# Patient Record
Sex: MALE | Race: WHITE | NOT HISPANIC OR LATINO | Employment: OTHER | URBAN - METROPOLITAN AREA
[De-identification: names, ages, dates, MRNs, and addresses within clinical notes are randomized per-mention and may not be internally consistent; named-entity substitution may affect disease eponyms.]

---

## 2023-08-15 ENCOUNTER — TELEPHONE (OUTPATIENT)
Dept: FAMILY MEDICINE CLINIC | Facility: CLINIC | Age: 73
End: 2023-08-15

## 2023-08-15 NOTE — TELEPHONE ENCOUNTER
CALLED PATIENT AND LET HIM KNOW HE NEEDS TO SIGN A RELEASE OF MEDICAL RECORDS HE CAN DO THAT TOMORROW WHEN HE COMES IN FOR HIS APPOINTMENT

## 2023-08-15 NOTE — TELEPHONE ENCOUNTER
Caller: Antony Byers    Relationship to patient: Self    Best call back number: [phone]    Patient is needing: PATIENT STATES HE WANTS PRACTICE TO CONTACT [facility] [phone] TO GET HIS MEDICAL RECORDS.   PATIENT STATES HE HAS GIVEN [facility] PERMISSION TO SEND RECORDS

## 2023-08-16 ENCOUNTER — TELEPHONE (OUTPATIENT)
Dept: FAMILY MEDICINE CLINIC | Facility: CLINIC | Age: 73
End: 2023-08-16

## 2023-08-16 ENCOUNTER — OFFICE VISIT (OUTPATIENT)
Dept: FAMILY MEDICINE CLINIC | Facility: CLINIC | Age: 73
End: 2023-08-16
Payer: MEDICARE

## 2023-08-16 VITALS
BODY MASS INDEX: 27.11 KG/M2 | OXYGEN SATURATION: 96 % | HEIGHT: 70 IN | HEART RATE: 84 BPM | DIASTOLIC BLOOD PRESSURE: 98 MMHG | RESPIRATION RATE: 18 BRPM | SYSTOLIC BLOOD PRESSURE: 198 MMHG | WEIGHT: 189.4 LBS

## 2023-08-16 DIAGNOSIS — Z93.3 COLOSTOMY IN PLACE: Primary | ICD-10-CM

## 2023-08-16 DIAGNOSIS — C20 RECTAL CANCER: ICD-10-CM

## 2023-08-16 NOTE — PROGRESS NOTES
"Subjective   Antony Byers is a 73 y.o. male.   Chief Complaint   Patient presents with    [facility]    Rectal Problems       History of Present Illness   Presents to the office today as a new patient to me.  He tells me he wants to get established here so he can get his \"supplies\".  He presents a packing slip which indicates that he received 2 boxes of 10 each [employer] manufactured Premier 1 piece drainable pouch cut to fit 2-1/2 inch colostomy bags.  Item #[id number].  This came from [address], [place] [address].  4 reorders call [phone].    No other information.  He is not worried about this.  He tells me that he is fine.  There was some information in epic.  Apparently he had a colectomy with colostomy in April 2022 at [facility] in [place].  Dr. Crescencio Murrieta DO was the surgeon.  Telephone number in [place] was [phone].  Telephone number in [place] was [phone].  He had a follow-up a month later and was released from follow-up as he had healed.  Apparently arrangements have been made for him to follow-up with a doctor and his surgeon recommended a colonoscopy in 1 year through the ostomy.  That has not been done.    It looks like he had radiation for 2 years before he had surgery.  Antony is not very good at providing details.  His blood pressure is very high today.  He tells me it is always fine when he goes to other doctors offices and he is just uptight about having to come here.        Patient Active Problem List    Diagnosis Date Noted    Colostomy in place 04/19/2022    Rectal cancer 09/23/2020     Note Last Updated: 8/16/2023     Told to follow with a doctor in [facility]             Past Surgical History:   Procedure Laterality Date    APPENDECTOMY      COLONOSCOPY      ENDOSCOPY      RECTAL SURGERY  04/04/2022    Dr. Murrieta - [facility]     No current outpatient medications on file prior to visit. " "    No current facility-administered medications on file prior to visit.     No Known Allergies  Social History     Socioeconomic History    Marital status: Single   Tobacco Use    Smoking status: Former     Packs/day: 1.50     Years: 50.00     Pack years: 75.00     Types: Cigarettes     Start date: 1963     Quit date: 2013     Years since quitting: 10.6     Passive exposure: Past    Smokeless tobacco: Never   Vaping Use    Vaping Use: Never used   Substance and Sexual Activity    Alcohol use: Yes     Alcohol/week: 2.0 - 3.0 standard drinks     Types: 2 - 3 Cans of beer per week    Drug use: Never    Sexual activity: Not Currently     Family History   Problem Relation Age of Onset    Clotting disorder Father     Heart attack Father        Review of Systems    Objective   BP (!) 198/98 (BP Location: Right arm, Patient Position: Sitting, Cuff Size: Adult)   Pulse 84   Resp 18   Ht 177.8 cm (70\")   Wt 85.9 kg (189 lb 6.4 oz)   SpO2 96%   BMI 27.18 kg/mý   Physical Exam  Constitutional:       Appearance: He is well-developed.      Comments:      HENT:      Head: Normocephalic and atraumatic.   Eyes:      Conjunctiva/sclera: Conjunctivae normal.   Cardiovascular:      Rate and Rhythm: Normal rate.   Pulmonary:      Effort: Pulmonary effort is normal.   Musculoskeletal:         General: Normal range of motion.      Cervical back: Normal range of motion.   Skin:     General: Skin is warm and dry.      Findings: No rash.   Neurological:      Mental Status: He is alert and oriented to person, place, and time.   Psychiatric:         Behavior: Behavior normal.         No visits with results within 4 Month(s) from this visit.   Latest known visit with results is:   No results found for any previous visit.         Assessment & Plan   Diagnoses and all orders for this visit:    1. Colostomy in place (Primary)  -     Ambulatory Referral to Gastroenterology    2. Rectal cancer  -     Ambulatory Referral to Gastroenterology  - " "    CBC & Differential  -     Comprehensive Metabolic Panel    General exam is unremarkable.  No swelling in his feet.  Gait was normal.  Abdominal exam deferred.  He did agree to get some lab work today.  We are going to try to follow through with the recommendation made at the time of his surgery to get a colonoscopy through the ostomy.  I would make a referral to Dr. Waldrop with [facility] for that.  I will see him again in 6 months.  We are going to contact this company [employer] and try to find out what we have to do to send him orders in for colostomy bag supplies.  Overall, I spent 30 minutes today with Antony discussing all the above.  He is not opposed to doing the colonoscopy or lab work, but he is just not very interested in pursuing other preventive care, doing other labs.  He keeps repeating his motto of \"why worry about things\".            Call with any problems or concerns before next visit       Return in about 6 months (around 2/16/2024).      Much of this report is an electronic transcription of spoken language to printed text using Dragon dictation software.  As such, the subtleties and finesse of spoken language may permit erroneous, or at times, nonsensical words or phrases to be inadvertently transcribed; thus changes may be made at a later date to rectify these errors.     Olga Estrella MD8/16/202314:04 EDT  This note has been electronically signed  "

## 2023-08-16 NOTE — TELEPHONE ENCOUNTER
Caller: Antony Byers    Relationship: Self    Best call back number: [phone]    What medication are you requestin BOXES OF COLOSTOMY BAGS     What are your current symptoms: N/A    How long have you been experiencing symptoms: N/A    Have you had these symptoms before:    [x] Yes  [] No    Have you been treated for these symptoms before:   [x] Yes  [] No    If a prescription is needed, what is your preferred pharmacy and phone number:      [facility]RE[facility]. [facility]   [address]  [place] [address]    Additional notes:    PATIENT CALLED AND SAID HE NEEDS A PRESCRIPTION FOR COLOSTOMY BAGS SENT TO THE PHARMACY FOR HIM    HE WANTS THEM TO GO HERE     [facility]ADONNA NAJERA. [facility]   [address]  [place] [address]

## 2023-08-17 LAB
ALBUMIN SERPL-MCNC: 4.4 G/DL (ref 3.8–4.8)
ALBUMIN/GLOB SERPL: 1.8 {RATIO} (ref 1.2–2.2)
ALP SERPL-CCNC: 79 IU/L (ref 44–121)
ALT SERPL-CCNC: 22 IU/L (ref 0–44)
AST SERPL-CCNC: 23 IU/L (ref 0–40)
BASOPHILS # BLD AUTO: 0 X10E3/UL (ref 0–0.2)
BASOPHILS NFR BLD AUTO: 1 %
BILIRUB SERPL-MCNC: 0.3 MG/DL (ref 0–1.2)
BUN SERPL-MCNC: 14 MG/DL (ref 8–27)
BUN/CREAT SERPL: 17 (ref 10–24)
CALCIUM SERPL-MCNC: 10.3 MG/DL (ref 8.6–10.2)
CHLORIDE SERPL-SCNC: 102 MMOL/L (ref 96–106)
CO2 SERPL-SCNC: 26 MMOL/L (ref 20–29)
CREAT SERPL-MCNC: 0.83 MG/DL (ref 0.76–1.27)
EGFRCR SERPLBLD CKD-EPI 2021: 92 ML/MIN/1.73
EOSINOPHIL # BLD AUTO: 0.1 X10E3/UL (ref 0–0.4)
EOSINOPHIL NFR BLD AUTO: 1 %
ERYTHROCYTE [DISTWIDTH] IN BLOOD BY AUTOMATED COUNT: 12.8 % (ref 11.6–15.4)
GLOBULIN SER CALC-MCNC: 2.4 G/DL (ref 1.5–4.5)
GLUCOSE SERPL-MCNC: 93 MG/DL (ref 70–99)
HCT VFR BLD AUTO: 44.7 % (ref 37.5–51)
HGB BLD-MCNC: 14.9 G/DL (ref 13–17.7)
IMM GRANULOCYTES # BLD AUTO: 0 X10E3/UL (ref 0–0.1)
IMM GRANULOCYTES NFR BLD AUTO: 0 %
LYMPHOCYTES # BLD AUTO: 1.7 X10E3/UL (ref 0.7–3.1)
LYMPHOCYTES NFR BLD AUTO: 26 %
MCH RBC QN AUTO: 31.8 PG (ref 26.6–33)
MCHC RBC AUTO-ENTMCNC: 33.3 G/DL (ref 31.5–35.7)
MCV RBC AUTO: 96 FL (ref 79–97)
MONOCYTES # BLD AUTO: 0.8 X10E3/UL (ref 0.1–0.9)
MONOCYTES NFR BLD AUTO: 12 %
NEUTROPHILS # BLD AUTO: 3.8 X10E3/UL (ref 1.4–7)
NEUTROPHILS NFR BLD AUTO: 60 %
PLATELET # BLD AUTO: 267 X10E3/UL (ref 150–450)
POTASSIUM SERPL-SCNC: 4.8 MMOL/L (ref 3.5–5.2)
PROT SERPL-MCNC: 6.8 G/DL (ref 6–8.5)
RBC # BLD AUTO: 4.68 X10E6/UL (ref 4.14–5.8)
SODIUM SERPL-SCNC: 141 MMOL/L (ref 134–144)
WBC # BLD AUTO: 6.4 X10E3/UL (ref 3.4–10.8)

## 2023-08-21 ENCOUNTER — TELEPHONE (OUTPATIENT)
Dept: FAMILY MEDICINE CLINIC | Facility: CLINIC | Age: 73
End: 2023-08-21
Payer: MEDICARE

## 2023-08-21 NOTE — TELEPHONE ENCOUNTER
Josue Farrar called and stated that Mr Byers's ostomy supplies will need to go to [facility] in [place], due to [employer] signing a contract with them.  Wm Farrar can't do it. Patient was notified by Tommy

## 2023-08-22 ENCOUNTER — TELEPHONE (OUTPATIENT)
Dept: FAMILY MEDICINE CLINIC | Facility: CLINIC | Age: 73
End: 2023-08-22
Payer: MEDICARE

## 2023-08-22 NOTE — TELEPHONE ENCOUNTER
HUB TO READ     LEFT MESSAGE TO RETURN CALL     let him know that his blood work from last week looked normal.  His blood count remains excellent at 14.9.  Blood sugar was normal at 93.  Kidney and liver tests are normal.  No changes based on these labs.  If he has any questions, please let me know.